# Patient Record
Sex: FEMALE | Race: BLACK OR AFRICAN AMERICAN | URBAN - METROPOLITAN AREA
[De-identification: names, ages, dates, MRNs, and addresses within clinical notes are randomized per-mention and may not be internally consistent; named-entity substitution may affect disease eponyms.]

---

## 2017-10-28 ENCOUNTER — EMERGENCY (EMERGENCY)
Facility: HOSPITAL | Age: 42
LOS: 1 days | Discharge: HOME | End: 2017-10-28
Attending: EMERGENCY MEDICINE | Admitting: EMERGENCY MEDICINE
Payer: SELF-PAY

## 2017-10-28 VITALS
SYSTOLIC BLOOD PRESSURE: 160 MMHG | OXYGEN SATURATION: 100 % | RESPIRATION RATE: 20 BRPM | TEMPERATURE: 99 F | DIASTOLIC BLOOD PRESSURE: 112 MMHG | HEART RATE: 96 BPM

## 2017-10-28 LAB
ALBUMIN SERPL ELPH-MCNC: 3.4 G/DL — SIGNIFICANT CHANGE UP (ref 3.3–5)
ALP SERPL-CCNC: 107 U/L — SIGNIFICANT CHANGE UP (ref 40–120)
ALT FLD-CCNC: 10 U/L — SIGNIFICANT CHANGE UP (ref 4–33)
AST SERPL-CCNC: 20 U/L — SIGNIFICANT CHANGE UP (ref 4–32)
BASE EXCESS BLDV CALC-SCNC: 0.5 MMOL/L — SIGNIFICANT CHANGE UP
BASOPHILS # BLD AUTO: 0.05 K/UL — SIGNIFICANT CHANGE UP (ref 0–0.2)
BASOPHILS NFR BLD AUTO: 0.8 % — SIGNIFICANT CHANGE UP (ref 0–2)
BILIRUB SERPL-MCNC: 0.3 MG/DL — SIGNIFICANT CHANGE UP (ref 0.2–1.2)
BLOOD GAS VENOUS - CREATININE: 1.74 MG/DL — HIGH (ref 0.5–1.3)
BUN SERPL-MCNC: 28 MG/DL — HIGH (ref 7–23)
CALCIUM SERPL-MCNC: 8.3 MG/DL — LOW (ref 8.4–10.5)
CHLORIDE BLDV-SCNC: 109 MMOL/L — HIGH (ref 96–108)
CHLORIDE SERPL-SCNC: 106 MMOL/L — SIGNIFICANT CHANGE UP (ref 98–107)
CO2 SERPL-SCNC: 24 MMOL/L — SIGNIFICANT CHANGE UP (ref 22–31)
CREAT SERPL-MCNC: 1.81 MG/DL — HIGH (ref 0.5–1.3)
EOSINOPHIL # BLD AUTO: 0.1 K/UL — SIGNIFICANT CHANGE UP (ref 0–0.5)
EOSINOPHIL NFR BLD AUTO: 1.5 % — SIGNIFICANT CHANGE UP (ref 0–6)
GAS PNL BLDV: 140 MMOL/L — SIGNIFICANT CHANGE UP (ref 136–146)
GLUCOSE BLDV-MCNC: 83 — SIGNIFICANT CHANGE UP (ref 70–99)
GLUCOSE SERPL-MCNC: 86 MG/DL — SIGNIFICANT CHANGE UP (ref 70–99)
HCG SERPL-ACNC: < 5 MIU/ML — SIGNIFICANT CHANGE UP
HCO3 BLDV-SCNC: 23 MMOL/L — SIGNIFICANT CHANGE UP (ref 20–27)
HCT VFR BLD CALC: 36.2 % — SIGNIFICANT CHANGE UP (ref 34.5–45)
HCT VFR BLDV CALC: 34.9 % — SIGNIFICANT CHANGE UP (ref 34.5–45)
HGB BLD-MCNC: 10.9 G/DL — LOW (ref 11.5–15.5)
HGB BLDV-MCNC: 11.3 G/DL — LOW (ref 11.5–15.5)
IMM GRANULOCYTES # BLD AUTO: 0.03 # — SIGNIFICANT CHANGE UP
IMM GRANULOCYTES NFR BLD AUTO: 0.5 % — SIGNIFICANT CHANGE UP (ref 0–1.5)
LACTATE BLDV-MCNC: 1.3 MMOL/L — SIGNIFICANT CHANGE UP (ref 0.5–2)
LYMPHOCYTES # BLD AUTO: 0.97 K/UL — LOW (ref 1–3.3)
LYMPHOCYTES # BLD AUTO: 14.6 % — SIGNIFICANT CHANGE UP (ref 13–44)
MCHC RBC-ENTMCNC: 24.6 PG — LOW (ref 27–34)
MCHC RBC-ENTMCNC: 30.1 % — LOW (ref 32–36)
MCV RBC AUTO: 81.7 FL — SIGNIFICANT CHANGE UP (ref 80–100)
MONOCYTES # BLD AUTO: 0.53 K/UL — SIGNIFICANT CHANGE UP (ref 0–0.9)
MONOCYTES NFR BLD AUTO: 8 % — SIGNIFICANT CHANGE UP (ref 2–14)
NEUTROPHILS # BLD AUTO: 4.98 K/UL — SIGNIFICANT CHANGE UP (ref 1.8–7.4)
NEUTROPHILS NFR BLD AUTO: 74.6 % — SIGNIFICANT CHANGE UP (ref 43–77)
NRBC # FLD: 0 — SIGNIFICANT CHANGE UP
PCO2 BLDV: 43 MMHG — SIGNIFICANT CHANGE UP (ref 41–51)
PH BLDV: 7.38 PH — SIGNIFICANT CHANGE UP (ref 7.32–7.43)
PLATELET # BLD AUTO: 306 K/UL — SIGNIFICANT CHANGE UP (ref 150–400)
PMV BLD: 10.2 FL — SIGNIFICANT CHANGE UP (ref 7–13)
PO2 BLDV: < 24 MMHG — LOW (ref 35–40)
POTASSIUM BLDV-SCNC: 3.8 MMOL/L — SIGNIFICANT CHANGE UP (ref 3.4–4.5)
POTASSIUM SERPL-MCNC: 4.4 MMOL/L — SIGNIFICANT CHANGE UP (ref 3.5–5.3)
POTASSIUM SERPL-SCNC: 4.4 MMOL/L — SIGNIFICANT CHANGE UP (ref 3.5–5.3)
PROT SERPL-MCNC: 7.6 G/DL — SIGNIFICANT CHANGE UP (ref 6–8.3)
RBC # BLD: 4.43 M/UL — SIGNIFICANT CHANGE UP (ref 3.8–5.2)
RBC # FLD: 19.9 % — HIGH (ref 10.3–14.5)
RETICS #: 0.2 10X6/UL — HIGH (ref 0.02–0.07)
RETICS/RBC NFR: 3.6 % — HIGH (ref 0.5–2.5)
SAO2 % BLDV: 18.7 % — LOW (ref 60–85)
SODIUM SERPL-SCNC: 143 MMOL/L — SIGNIFICANT CHANGE UP (ref 135–145)
WBC # BLD: 6.66 K/UL — SIGNIFICANT CHANGE UP (ref 3.8–10.5)
WBC # FLD AUTO: 6.66 K/UL — SIGNIFICANT CHANGE UP (ref 3.8–10.5)

## 2017-10-28 PROCEDURE — 99285 EMERGENCY DEPT VISIT HI MDM: CPT

## 2017-10-28 PROCEDURE — 71020: CPT | Mod: 26

## 2017-10-28 RX ORDER — HYDROMORPHONE HYDROCHLORIDE 2 MG/ML
2 INJECTION INTRAMUSCULAR; INTRAVENOUS; SUBCUTANEOUS ONCE
Refills: 0 | Status: DISCONTINUED | OUTPATIENT
Start: 2017-10-28 | End: 2017-10-28

## 2017-10-28 RX ORDER — DIPHENHYDRAMINE HCL 50 MG
25 CAPSULE ORAL ONCE
Refills: 0 | Status: COMPLETED | OUTPATIENT
Start: 2017-10-28 | End: 2017-10-28

## 2017-10-28 RX ADMIN — HYDROMORPHONE HYDROCHLORIDE 2 MILLIGRAM(S): 2 INJECTION INTRAMUSCULAR; INTRAVENOUS; SUBCUTANEOUS at 22:28

## 2017-10-28 RX ADMIN — HYDROMORPHONE HYDROCHLORIDE 2 MILLIGRAM(S): 2 INJECTION INTRAMUSCULAR; INTRAVENOUS; SUBCUTANEOUS at 22:00

## 2017-10-28 RX ADMIN — Medication 25 MILLIGRAM(S): at 22:00

## 2017-10-28 RX ADMIN — HYDROMORPHONE HYDROCHLORIDE 2 MILLIGRAM(S): 2 INJECTION INTRAMUSCULAR; INTRAVENOUS; SUBCUTANEOUS at 22:45

## 2017-10-28 RX ADMIN — HYDROMORPHONE HYDROCHLORIDE 2 MILLIGRAM(S): 2 INJECTION INTRAMUSCULAR; INTRAVENOUS; SUBCUTANEOUS at 22:15

## 2017-10-28 NOTE — ED PROVIDER NOTE - ATTENDING CONTRIBUTION TO CARE
Pertinent PMH/PSH/FHx/SHx and Review of Systems contained within:  43yo F pmh inclusive of WPW s/p ablation, CHF (EF of 40% per pt), sickle cell dx s/p recent admission at OSH for acute chest syndrome, presents for back and abd pain. States sickle cell crisis is usually in lower back and pain typical of crisis started approx 3 days ago, gradually worsening. Only different from typical SSC is abdominal involvement this time around - reports RLQ abd pain also X 3 days, constant, non-radiating, not exacerbated/relieved by anything in particular (taking 4mg po hydromorphone q4hrs today) , associated w/ dysuria. LMP was Oct 4th. Last BM was today and "normal". No fever/chills, No photophobia/eye pain/changes in vision, No ear pain/sore throat/dysphagia, No chest pain/palpitations, no SOB/cough/wheeze/stridor, No N/V/D, no frequency/discharge, No neck pain, no rash, no changes in neurological status/function.   Gen: Alert, in mild distress  Head: NC, AT, EOMI, normal lids/conjunctiva  ENT:  normal hearing, patent oropharynx without erythema/exudate, uvula midline  Neck: +supple, no tenderness/meningismus/JVD, +Trachea midline  Chest: no chest wall tenderness, equal chest rise  Pulm: Bilateral BS, normal resp effort, no wheeze/stridor/retractions  CV: RRR, no M/R/G, +dist pulses  Abd: soft, ND, +TTP in RLQ and suprapubic region, +BS, no hepatosplenomegaly  Rectal: deferred  Mskel: no edema/erythema/cyanosis, +TTP lower back  Skin: no rash  Neuro: AAOx3  MDM:  43yo F pmh as above, here for lower back pain, RLQ abd pain, Pertinent PMH/PSH/FHx/SHx and Review of Systems contained within:  41yo F pmh inclusive of WPW s/p ablation, CHF (EF of 40% per pt), sickle cell dx s/p recent admission at OSH for acute chest syndrome, presents for back and abd pain. States sickle cell crisis is usually in lower back and pain typical of crisis started approx 3 days ago, gradually worsening. Only different from typical SSC is abdominal involvement this time around - reports RLQ abd pain also X 3 days, constant, non-radiating, not exacerbated/relieved by anything in particular (taking 4mg po hydromorphone q4hrs today) , associated w/ dysuria. LMP was Oct 4th. Last BM was today and "normal". No fever/chills, No photophobia/eye pain/changes in vision, No ear pain/sore throat/dysphagia, No chest pain/palpitations, no SOB/cough/wheeze/stridor, No N/V/D, no frequency/discharge, No neck pain, no rash, no changes in neurological status/function.   Gen: Alert, in mild distress  Head: NC, AT, EOMI, normal lids/conjunctiva  ENT:  normal hearing, patent oropharynx without erythema/exudate, uvula midline  Neck: +supple, no tenderness/meningismus/JVD, +Trachea midline  Chest: no chest wall tenderness, equal chest rise  Pulm: Bilateral BS, normal resp effort, no wheeze/stridor/retractions  CV: RRR, no M/R/G, +dist pulses  Abd: soft, ND, +TTP in RLQ and suprapubic region, +BS, no hepatosplenomegaly  Rectal: deferred  Mskel: no edema/erythema/cyanosis, +TTP lower back  Skin: no rash  Neuro: AAOx3  MDM:  41yo F pmh as above, here for lower back pain, RLQ abd pain, dysuria. Diff dx includes UTI/pyelonephritis vs appendicitis vs ovarian pathology (torsion vs cyst) vs SSC. Labs,UA, iv pain mngmt, US, possibly CT if US not conclusive.

## 2017-10-28 NOTE — ED ADULT TRIAGE NOTE - CHIEF COMPLAINT QUOTE
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 Also c/o RLQ pain x 3d. Also c/o SOB. Denies all other adverse symptoms at this time.

## 2017-10-28 NOTE — ED PROVIDER NOTE - PLAN OF CARE
You need to follow up with your doctor asap regarding your blood pressure and chronic sickle cell management. You were found to also have a urine infection so we sent antibiotics to your pharmacy for you. If you have any new issues please come right back to the ED. If you develop a fever, pain in your sides, or you are persistently vomiting you need to come back to the ED.

## 2017-10-28 NOTE — ED ADULT NURSE NOTE - OBJECTIVE STATEMENT
pt brought to rm 4, A&Ox3, amb @ baseline, multiple scars noted on upper extremities pt states 2/2 IVs in past, c/o abdm pain x past 2 days w/ intermittent nausea no episodes of emesis, pt states usual SCC symptoms, unable to palpate abdm 2/2 pain, last BM today, VS as noted, MD corral, MD Blanca @ bedside for IV US, labs sent, awaiting results and further orders, will continue to monitor.

## 2017-10-28 NOTE — ED PROVIDER NOTE - CARE PLAN
Principal Discharge DX:	Abdominal pain  Secondary Diagnosis:	Sickle cell anemia with crisis Principal Discharge DX:	Abdominal pain  Instructions for follow-up, activity and diet:	You need to follow up with your doctor asap regarding your blood pressure and chronic sickle cell management. You were found to also have a urine infection so we sent antibiotics to your pharmacy for you. If you have any new issues please come right back to the ED. If you develop a fever, pain in your sides, or you are persistently vomiting you need to come back to the ED.  Secondary Diagnosis:	Sickle cell anemia with crisis

## 2017-10-28 NOTE — ED PROVIDER NOTE - PROGRESS NOTE DETAILS
Pt playing solitaire on cell phone. No acute process on CT wet read. Pt prefers to be discharged if no acute process. Will f/u with hematologist in AM. Requesting more PO dilaudid + PO benadryl. MIKE. Reg Bagley: Patient signed out by Dr. Smiley. She is sitting upright with her cellphone appears to be texting. She does not appear to be any acute distress. Per Dr. Smiley plan is to discharge the patient after official radiology CT read. Will follow up.

## 2017-10-29 VITALS
OXYGEN SATURATION: 100 % | DIASTOLIC BLOOD PRESSURE: 119 MMHG | RESPIRATION RATE: 15 BRPM | HEART RATE: 102 BPM | SYSTOLIC BLOOD PRESSURE: 185 MMHG | TEMPERATURE: 98 F

## 2017-10-29 LAB
APPEARANCE UR: SIGNIFICANT CHANGE UP
BACTERIA # UR AUTO: HIGH
BILIRUB UR-MCNC: NEGATIVE — SIGNIFICANT CHANGE UP
BLOOD UR QL VISUAL: NEGATIVE — SIGNIFICANT CHANGE UP
COLOR SPEC: YELLOW — SIGNIFICANT CHANGE UP
GLUCOSE UR-MCNC: NEGATIVE — SIGNIFICANT CHANGE UP
HCG SERPL-ACNC: < 5 MIU/ML — SIGNIFICANT CHANGE UP
KETONES UR-MCNC: NEGATIVE — SIGNIFICANT CHANGE UP
LEUKOCYTE ESTERASE UR-ACNC: SIGNIFICANT CHANGE UP
MUCOUS THREADS # UR AUTO: SIGNIFICANT CHANGE UP
NITRITE UR-MCNC: NEGATIVE — SIGNIFICANT CHANGE UP
PH UR: 6 — SIGNIFICANT CHANGE UP (ref 4.6–8)
PROT UR-MCNC: 500 — HIGH
RBC CASTS # UR COMP ASSIST: SIGNIFICANT CHANGE UP (ref 0–?)
SP GR SPEC: 1.02 — SIGNIFICANT CHANGE UP (ref 1–1.03)
SQUAMOUS # UR AUTO: SIGNIFICANT CHANGE UP
UROBILINOGEN FLD QL: 1 E.U. — SIGNIFICANT CHANGE UP (ref 0.1–0.2)
WBC UR QL: HIGH (ref 0–?)

## 2017-10-29 PROCEDURE — 74176 CT ABD & PELVIS W/O CONTRAST: CPT | Mod: 26

## 2017-10-29 PROCEDURE — 76856 US EXAM PELVIC COMPLETE: CPT | Mod: 26

## 2017-10-29 PROCEDURE — 76705 ECHO EXAM OF ABDOMEN: CPT | Mod: 26

## 2017-10-29 RX ORDER — HYDROMORPHONE HYDROCHLORIDE 2 MG/ML
4 INJECTION INTRAMUSCULAR; INTRAVENOUS; SUBCUTANEOUS ONCE
Refills: 0 | Status: DISCONTINUED | OUTPATIENT
Start: 2017-10-29 | End: 2017-10-29

## 2017-10-29 RX ORDER — HYDROMORPHONE HYDROCHLORIDE 2 MG/ML
1 INJECTION INTRAMUSCULAR; INTRAVENOUS; SUBCUTANEOUS ONCE
Refills: 0 | Status: DISCONTINUED | OUTPATIENT
Start: 2017-10-29 | End: 2017-10-29

## 2017-10-29 RX ORDER — HYDROMORPHONE HYDROCHLORIDE 2 MG/ML
2 INJECTION INTRAMUSCULAR; INTRAVENOUS; SUBCUTANEOUS ONCE
Refills: 0 | Status: DISCONTINUED | OUTPATIENT
Start: 2017-10-29 | End: 2017-10-29

## 2017-10-29 RX ORDER — DIPHENHYDRAMINE HCL 50 MG
25 CAPSULE ORAL ONCE
Refills: 0 | Status: COMPLETED | OUTPATIENT
Start: 2017-10-29 | End: 2017-10-29

## 2017-10-29 RX ORDER — CEPHALEXIN 500 MG
1 CAPSULE ORAL
Qty: 20 | Refills: 0
Start: 2017-10-29 | End: 2017-11-03

## 2017-10-29 RX ADMIN — HYDROMORPHONE HYDROCHLORIDE 4 MILLIGRAM(S): 2 INJECTION INTRAMUSCULAR; INTRAVENOUS; SUBCUTANEOUS at 07:26

## 2017-10-29 RX ADMIN — HYDROMORPHONE HYDROCHLORIDE 4 MILLIGRAM(S): 2 INJECTION INTRAMUSCULAR; INTRAVENOUS; SUBCUTANEOUS at 02:41

## 2017-10-29 RX ADMIN — HYDROMORPHONE HYDROCHLORIDE 4 MILLIGRAM(S): 2 INJECTION INTRAMUSCULAR; INTRAVENOUS; SUBCUTANEOUS at 06:00

## 2017-10-29 RX ADMIN — HYDROMORPHONE HYDROCHLORIDE 2 MILLIGRAM(S): 2 INJECTION INTRAMUSCULAR; INTRAVENOUS; SUBCUTANEOUS at 00:22

## 2017-10-29 RX ADMIN — HYDROMORPHONE HYDROCHLORIDE 4 MILLIGRAM(S): 2 INJECTION INTRAMUSCULAR; INTRAVENOUS; SUBCUTANEOUS at 04:50

## 2017-10-29 RX ADMIN — Medication 25 MILLIGRAM(S): at 00:46

## 2017-10-29 RX ADMIN — HYDROMORPHONE HYDROCHLORIDE 2 MILLIGRAM(S): 2 INJECTION INTRAMUSCULAR; INTRAVENOUS; SUBCUTANEOUS at 00:45

## 2017-10-29 RX ADMIN — HYDROMORPHONE HYDROCHLORIDE 4 MILLIGRAM(S): 2 INJECTION INTRAMUSCULAR; INTRAVENOUS; SUBCUTANEOUS at 04:00

## 2017-10-29 NOTE — ED ADULT NURSE REASSESSMENT NOTE - NS ED NURSE REASSESS COMMENT FT1
alert states pain is improved but not gone  wants to leave discharged and understands instructions  heplock removed

## 2017-10-31 ENCOUNTER — EMERGENCY (EMERGENCY)
Facility: HOSPITAL | Age: 42
LOS: 1 days | Discharge: LEFT BEFORE DISCHARGE | End: 2017-10-31
Attending: EMERGENCY MEDICINE | Admitting: INTERNAL MEDICINE
Payer: MEDICAID

## 2017-10-31 VITALS
OXYGEN SATURATION: 99 % | DIASTOLIC BLOOD PRESSURE: 100 MMHG | RESPIRATION RATE: 20 BRPM | SYSTOLIC BLOOD PRESSURE: 150 MMHG | HEART RATE: 84 BPM | TEMPERATURE: 98 F

## 2017-10-31 LAB
ALBUMIN SERPL ELPH-MCNC: 3.3 G/DL — SIGNIFICANT CHANGE UP (ref 3.3–5)
ALP SERPL-CCNC: 118 U/L — SIGNIFICANT CHANGE UP (ref 40–120)
ALT FLD-CCNC: 13 U/L — SIGNIFICANT CHANGE UP (ref 4–33)
APTT BLD: 31.9 SEC — SIGNIFICANT CHANGE UP (ref 27.5–37.4)
AST SERPL-CCNC: 22 U/L — SIGNIFICANT CHANGE UP (ref 4–32)
BASE EXCESS BLDV CALC-SCNC: -0.5 MMOL/L — SIGNIFICANT CHANGE UP
BASOPHILS # BLD AUTO: 0.04 K/UL — SIGNIFICANT CHANGE UP (ref 0–0.2)
BASOPHILS NFR BLD AUTO: 0.6 % — SIGNIFICANT CHANGE UP (ref 0–2)
BILIRUB SERPL-MCNC: 0.3 MG/DL — SIGNIFICANT CHANGE UP (ref 0.2–1.2)
BLD GP AB SCN SERPL QL: NEGATIVE — SIGNIFICANT CHANGE UP
BLOOD GAS VENOUS - CREATININE: 1.4 MG/DL — HIGH (ref 0.5–1.3)
BUN SERPL-MCNC: 20 MG/DL — SIGNIFICANT CHANGE UP (ref 7–23)
CALCIUM SERPL-MCNC: 8.4 MG/DL — SIGNIFICANT CHANGE UP (ref 8.4–10.5)
CHLORIDE BLDV-SCNC: 110 MMOL/L — HIGH (ref 96–108)
CHLORIDE SERPL-SCNC: 106 MMOL/L — SIGNIFICANT CHANGE UP (ref 98–107)
CK MB BLD-MCNC: 1.91 NG/ML — SIGNIFICANT CHANGE UP (ref 1–4.7)
CK SERPL-CCNC: 90 U/L — SIGNIFICANT CHANGE UP (ref 25–170)
CO2 SERPL-SCNC: 23 MMOL/L — SIGNIFICANT CHANGE UP (ref 22–31)
CREAT SERPL-MCNC: 1.37 MG/DL — HIGH (ref 0.5–1.3)
EOSINOPHIL # BLD AUTO: 0.15 K/UL — SIGNIFICANT CHANGE UP (ref 0–0.5)
EOSINOPHIL NFR BLD AUTO: 2.2 % — SIGNIFICANT CHANGE UP (ref 0–6)
GAS PNL BLDV: 138 MMOL/L — SIGNIFICANT CHANGE UP (ref 136–146)
GLUCOSE BLDV-MCNC: 77 — SIGNIFICANT CHANGE UP (ref 70–99)
GLUCOSE SERPL-MCNC: 80 MG/DL — SIGNIFICANT CHANGE UP (ref 70–99)
HCO3 BLDV-SCNC: 24 MMOL/L — SIGNIFICANT CHANGE UP (ref 20–27)
HCT VFR BLD CALC: 29.2 % — LOW (ref 34.5–45)
HCT VFR BLDV CALC: 29.5 % — LOW (ref 34.5–45)
HGB BLD-MCNC: 9 G/DL — LOW (ref 11.5–15.5)
HGB BLDV-MCNC: 9.5 G/DL — LOW (ref 11.5–15.5)
HIV1 AG SER QL: SIGNIFICANT CHANGE UP
HIV1+2 AB SPEC QL: SIGNIFICANT CHANGE UP
IMM GRANULOCYTES # BLD AUTO: 0.06 # — SIGNIFICANT CHANGE UP
IMM GRANULOCYTES NFR BLD AUTO: 0.9 % — SIGNIFICANT CHANGE UP (ref 0–1.5)
INR BLD: 0.99 — SIGNIFICANT CHANGE UP (ref 0.88–1.17)
LACTATE BLDV-MCNC: 1.1 MMOL/L — SIGNIFICANT CHANGE UP (ref 0.5–2)
LDH SERPL L TO P-CCNC: 208 U/L — SIGNIFICANT CHANGE UP (ref 135–225)
LYMPHOCYTES # BLD AUTO: 0.93 K/UL — LOW (ref 1–3.3)
LYMPHOCYTES # BLD AUTO: 13.7 % — SIGNIFICANT CHANGE UP (ref 13–44)
MCHC RBC-ENTMCNC: 24.5 PG — LOW (ref 27–34)
MCHC RBC-ENTMCNC: 30.8 % — LOW (ref 32–36)
MCV RBC AUTO: 79.3 FL — LOW (ref 80–100)
MONOCYTES # BLD AUTO: 0.52 K/UL — SIGNIFICANT CHANGE UP (ref 0–0.9)
MONOCYTES NFR BLD AUTO: 7.7 % — SIGNIFICANT CHANGE UP (ref 2–14)
NEUTROPHILS # BLD AUTO: 5.07 K/UL — SIGNIFICANT CHANGE UP (ref 1.8–7.4)
NEUTROPHILS NFR BLD AUTO: 74.9 % — SIGNIFICANT CHANGE UP (ref 43–77)
NRBC # FLD: 0 — SIGNIFICANT CHANGE UP
NT-PROBNP SERPL-SCNC: SIGNIFICANT CHANGE UP PG/ML
PCO2 BLDV: 34 MMHG — LOW (ref 41–51)
PH BLDV: 7.45 PH — HIGH (ref 7.32–7.43)
PLATELET # BLD AUTO: 263 K/UL — SIGNIFICANT CHANGE UP (ref 150–400)
PMV BLD: 11.2 FL — SIGNIFICANT CHANGE UP (ref 7–13)
PO2 BLDV: 41 MMHG — HIGH (ref 35–40)
POTASSIUM BLDV-SCNC: 3.8 MMOL/L — SIGNIFICANT CHANGE UP (ref 3.4–4.5)
POTASSIUM SERPL-MCNC: 4 MMOL/L — SIGNIFICANT CHANGE UP (ref 3.5–5.3)
POTASSIUM SERPL-SCNC: 4 MMOL/L — SIGNIFICANT CHANGE UP (ref 3.5–5.3)
PROT SERPL-MCNC: 7.1 G/DL — SIGNIFICANT CHANGE UP (ref 6–8.3)
PROTHROM AB SERPL-ACNC: 11.1 SEC — SIGNIFICANT CHANGE UP (ref 9.8–13.1)
RBC # BLD: 3.68 M/UL — LOW (ref 3.8–5.2)
RBC # FLD: 19.8 % — HIGH (ref 10.3–14.5)
RETICS #: 0.1 10X6/UL — HIGH (ref 0.02–0.07)
RETICS/RBC NFR: 3.3 % — HIGH (ref 0.5–2.5)
RH IG SCN BLD-IMP: POSITIVE — SIGNIFICANT CHANGE UP
SAO2 % BLDV: 74.9 % — SIGNIFICANT CHANGE UP (ref 60–85)
SODIUM SERPL-SCNC: 143 MMOL/L — SIGNIFICANT CHANGE UP (ref 135–145)
TROPONIN T SERPL-MCNC: < 0.06 NG/ML — SIGNIFICANT CHANGE UP (ref 0–0.06)
WBC # BLD: 6.77 K/UL — SIGNIFICANT CHANGE UP (ref 3.8–10.5)
WBC # FLD AUTO: 6.77 K/UL — SIGNIFICANT CHANGE UP (ref 3.8–10.5)

## 2017-10-31 PROCEDURE — 71020: CPT | Mod: 26

## 2017-10-31 PROCEDURE — 99285 EMERGENCY DEPT VISIT HI MDM: CPT

## 2017-10-31 RX ORDER — HYDROMORPHONE HYDROCHLORIDE 2 MG/ML
2 INJECTION INTRAMUSCULAR; INTRAVENOUS; SUBCUTANEOUS ONCE
Refills: 0 | Status: DISCONTINUED | OUTPATIENT
Start: 2017-10-31 | End: 2017-10-31

## 2017-10-31 RX ORDER — CEPHALEXIN 500 MG
500 CAPSULE ORAL ONCE
Refills: 0 | Status: COMPLETED | OUTPATIENT
Start: 2017-10-31 | End: 2017-10-31

## 2017-10-31 RX ORDER — DIPHENHYDRAMINE HCL 50 MG
50 CAPSULE ORAL ONCE
Refills: 0 | Status: COMPLETED | OUTPATIENT
Start: 2017-10-31 | End: 2017-10-31

## 2017-10-31 RX ORDER — CARVEDILOL PHOSPHATE 80 MG/1
12.5 CAPSULE, EXTENDED RELEASE ORAL ONCE
Refills: 0 | Status: COMPLETED | OUTPATIENT
Start: 2017-10-31 | End: 2017-10-31

## 2017-10-31 RX ORDER — AMIODARONE HYDROCHLORIDE 400 MG/1
200 TABLET ORAL DAILY
Refills: 0 | Status: DISCONTINUED | OUTPATIENT
Start: 2017-10-31 | End: 2017-11-01

## 2017-10-31 RX ORDER — HYDROMORPHONE HYDROCHLORIDE 2 MG/ML
4 INJECTION INTRAMUSCULAR; INTRAVENOUS; SUBCUTANEOUS ONCE
Refills: 0 | Status: DISCONTINUED | OUTPATIENT
Start: 2017-10-31 | End: 2017-10-31

## 2017-10-31 RX ORDER — SODIUM CHLORIDE 9 MG/ML
1000 INJECTION INTRAMUSCULAR; INTRAVENOUS; SUBCUTANEOUS ONCE
Refills: 0 | Status: COMPLETED | OUTPATIENT
Start: 2017-10-31 | End: 2017-10-31

## 2017-10-31 RX ADMIN — CARVEDILOL PHOSPHATE 12.5 MILLIGRAM(S): 80 CAPSULE, EXTENDED RELEASE ORAL at 23:17

## 2017-10-31 RX ADMIN — SODIUM CHLORIDE 2000 MILLILITER(S): 9 INJECTION INTRAMUSCULAR; INTRAVENOUS; SUBCUTANEOUS at 23:33

## 2017-10-31 RX ADMIN — Medication 50 MILLIGRAM(S): at 21:34

## 2017-10-31 RX ADMIN — HYDROMORPHONE HYDROCHLORIDE 2 MILLIGRAM(S): 2 INJECTION INTRAMUSCULAR; INTRAVENOUS; SUBCUTANEOUS at 23:33

## 2017-10-31 RX ADMIN — AMIODARONE HYDROCHLORIDE 200 MILLIGRAM(S): 400 TABLET ORAL at 23:44

## 2017-10-31 RX ADMIN — HYDROMORPHONE HYDROCHLORIDE 2 MILLIGRAM(S): 2 INJECTION INTRAMUSCULAR; INTRAVENOUS; SUBCUTANEOUS at 21:34

## 2017-10-31 RX ADMIN — HYDROMORPHONE HYDROCHLORIDE 2 MILLIGRAM(S): 2 INJECTION INTRAMUSCULAR; INTRAVENOUS; SUBCUTANEOUS at 23:44

## 2017-10-31 RX ADMIN — HYDROMORPHONE HYDROCHLORIDE 2 MILLIGRAM(S): 2 INJECTION INTRAMUSCULAR; INTRAVENOUS; SUBCUTANEOUS at 22:08

## 2017-10-31 RX ADMIN — Medication 500 MILLIGRAM(S): at 23:44

## 2017-10-31 NOTE — ED PROVIDER NOTE - OBJECTIVE STATEMENT
41yo F pmh inclusive of WPW s/p ablation, CHF (EF of 40% per pt), sickle cell dx s/p recent admission at OSH for acute chest syndrome, presents with pelvis pain and lower back pain typical of VOC.  Associated SOB and runs of SVT which patient takes Coreg and Amiodarone for.

## 2017-10-31 NOTE — ED PROVIDER NOTE - ATTENDING CONTRIBUTION TO CARE
I, Jennifer Cabot, MD, have performed a history and physical exam of the patient and discussed their management with the resident. I reviewed the resident's note and agree with the documented findings and plan of care. My medical decision making and observations are found above.    Cabot: 42F with PMH of WPW s/p ablation, CHF (EF of 40% per pt), sickle cell dx s/p recent admission at OSH for acute chest syndrome, who comes in with another pain crisis.  She reports bilateral chest pain, low back pain, and pelvic pain x 1 day.  No F/C/N/V/D/urinary sx.  No cough, sputum, SOB.  On exam, HDS, non toxic appearing, lungs CTAB, heart sounds normal, abd benign, no CVAT, no LE edema.  DDx includes simple SC crisis vs acute chest, UTI, pelvic infection.  Will check pelvic exam, full labs, including CBC, retic count, UA, cultures, CXR, LA.  Will control pain and hydrate.  Will reassess.

## 2017-10-31 NOTE — ED ADULT TRIAGE NOTE - CHIEF COMPLAINT QUOTE
Patient brought to ER from home by EMS for c/o side, hip and back pain related to sickle cell crisis. The Patient also has pain to right lower pelvic area that she states is not related to her sickle cell crisis.

## 2017-10-31 NOTE — ED ADULT NURSE NOTE - OBJECTIVE STATEMENT
Patient received to room 14, Aox4 and ambulatory. C/o 10/10 pain. Refuses to take off clothes, refuses to let nurse try to put in IV. States only wants U/S IV. Dr Ybarra aware. Patient c/o of hip and shoulder pain. States it triggers her SVT. Awaiting IV US and further orders. Will continue to monitor. Patient received to room 14, Aox4 and ambulatory. C/o 10/10 pain. Refuses to take off clothes, refuses to let nurse try to put in IV. States only wants U/S IV. Dr Ybarra aware. Patient c/o of hip and shoulder pain. States it triggers her SVT. Awaiting IV US and further orders. Will continue to monitor. C/o pain in hip shoulder and rib cage.

## 2017-10-31 NOTE — ED PROVIDER NOTE - PROGRESS NOTE DETAILS
JW Multiple runs of polymorphic VT in the ED.  Amiodarone.  Cardiology consult. JW Pt loaded with amiodarone admitted to CCU.  Discussed with cardiology NP.  Will give lidocaine with additional run. Jovani HSIEH: I received sign out on this patient from Dr Cabot.  Upon reassessment pt found to have runs or polymorphic VT on the cardiac monitor.  Pt takes amiodarone, but has not taken her medications recently.  Pt received PO home dose of amiodarone previously, will load with 150mg amio.  CCU consulted for admission.  Pt remains hypertensive, received IV dilaudid for pain.  Pt to be admitted to CCU.

## 2017-10-31 NOTE — ED PROVIDER NOTE - MEDICAL DECISION MAKING DETAILS
43yo F pmh inclusive of WPW s/p ablation, CHF (EF of 40% per pt), sickle cell dx s/p recent admission at OSH for acute chest syndrome, presents with pelvis pain and lower back pain typical of VOC.  Tachycardia.  Likely VOC.  Evaluate and treat for VOC.  CT from last visit reveals anasarca.  Pelvic US to R/O torsion.  Treat symptomatically.  Admit. 43yo F pmh inclusive of WPW s/p ablation, CHF (EF of 40% per pt), sickle cell dx s/p recent admission at OSH for acute chest syndrome, presents with pelvis pain and lower back pain typical of VOC.  Tachycardia.  Likely VOC.  Evaluate and treat for VOC.  CT from last visit reveals anasarca.  Pelvic US to R/O torsion.  Treat symptomatically.  Admit.    Cabot: 42F with PMH of WPW s/p ablation, CHF (EF of 40% per pt), sickle cell dx s/p recent admission at OSH for acute chest syndrome, who comes in with another pain crisis.  She reports bilateral chest pain, low back pain, and pelvic pain x 1 day.  No F/C/N/V/D/urinary sx.  No cough, sputum, SOB.  On exam, HDS, non toxic appearing, lungs CTAB, heart sounds normal, abd benign, no CVAT, no LE edema.  DDx includes simple SC crisis vs acute chest, UTI, pelvic infection.  Will check pelvic exam, full labs, including CBC, retic count, UA, cultures, CXR, LA.  Will control pain and hydrate.  Will reassess.

## 2017-11-01 VITALS — WEIGHT: 119.49 LBS

## 2017-11-01 DIAGNOSIS — D57.01 HB-SS DISEASE WITH ACUTE CHEST SYNDROME: ICD-10-CM

## 2017-11-01 DIAGNOSIS — I82.90 ACUTE EMBOLISM AND THROMBOSIS OF UNSPECIFIED VEIN: ICD-10-CM

## 2017-11-01 DIAGNOSIS — Z95.828 PRESENCE OF OTHER VASCULAR IMPLANTS AND GRAFTS: Chronic | ICD-10-CM

## 2017-11-01 DIAGNOSIS — I50.9 HEART FAILURE, UNSPECIFIED: ICD-10-CM

## 2017-11-01 DIAGNOSIS — I26.99 OTHER PULMONARY EMBOLISM WITHOUT ACUTE COR PULMONALE: ICD-10-CM

## 2017-11-01 DIAGNOSIS — I47.2 VENTRICULAR TACHYCARDIA: ICD-10-CM

## 2017-11-01 LAB
HAV IGM SER-ACNC: NONREACTIVE — SIGNIFICANT CHANGE UP
HBV CORE IGM SER-ACNC: NONREACTIVE — SIGNIFICANT CHANGE UP
HBV SURFACE AG SER-ACNC: NONREACTIVE — SIGNIFICANT CHANGE UP
HCG SERPL-ACNC: < 5 MIU/ML — SIGNIFICANT CHANGE UP
HCV AB S/CO SERPL IA: 0.22 S/CO — SIGNIFICANT CHANGE UP
HCV AB SERPL-IMP: SIGNIFICANT CHANGE UP
MAGNESIUM SERPL-MCNC: 2 MG/DL — SIGNIFICANT CHANGE UP (ref 1.6–2.6)

## 2017-11-01 RX ORDER — AMIODARONE HYDROCHLORIDE 400 MG/1
150 TABLET ORAL ONCE
Refills: 0 | Status: COMPLETED | OUTPATIENT
Start: 2017-11-01 | End: 2017-11-01

## 2017-11-01 RX ORDER — HYDRALAZINE HCL 50 MG
10 TABLET ORAL DAILY
Refills: 0 | Status: DISCONTINUED | OUTPATIENT
Start: 2017-11-01 | End: 2017-11-01

## 2017-11-01 RX ORDER — FUROSEMIDE 40 MG
40 TABLET ORAL DAILY
Refills: 0 | Status: DISCONTINUED | OUTPATIENT
Start: 2017-11-01 | End: 2017-11-01

## 2017-11-01 RX ORDER — RIVAROXABAN 15 MG-20MG
20 KIT ORAL EVERY 24 HOURS
Refills: 0 | Status: DISCONTINUED | OUTPATIENT
Start: 2017-11-01 | End: 2017-11-01

## 2017-11-01 RX ORDER — HYDROMORPHONE HYDROCHLORIDE 2 MG/ML
2 INJECTION INTRAMUSCULAR; INTRAVENOUS; SUBCUTANEOUS EVERY 4 HOURS
Refills: 0 | Status: DISCONTINUED | OUTPATIENT
Start: 2017-11-01 | End: 2017-11-01

## 2017-11-01 RX ORDER — ACETAMINOPHEN 500 MG
975 TABLET ORAL ONCE
Refills: 0 | Status: DISCONTINUED | OUTPATIENT
Start: 2017-11-01 | End: 2017-11-01

## 2017-11-01 RX ORDER — ACETAMINOPHEN 500 MG
1000 TABLET ORAL ONCE
Refills: 0 | Status: COMPLETED | OUTPATIENT
Start: 2017-11-01 | End: 2017-11-01

## 2017-11-01 RX ORDER — HYDROMORPHONE HYDROCHLORIDE 2 MG/ML
2 INJECTION INTRAMUSCULAR; INTRAVENOUS; SUBCUTANEOUS ONCE
Refills: 0 | Status: DISCONTINUED | OUTPATIENT
Start: 2017-11-01 | End: 2017-11-01

## 2017-11-01 RX ORDER — SENNA PLUS 8.6 MG/1
2 TABLET ORAL AT BEDTIME
Refills: 0 | Status: DISCONTINUED | OUTPATIENT
Start: 2017-11-01 | End: 2017-11-01

## 2017-11-01 RX ORDER — INFLUENZA VIRUS VACCINE 15; 15; 15; 15 UG/.5ML; UG/.5ML; UG/.5ML; UG/.5ML
0.5 SUSPENSION INTRAMUSCULAR ONCE
Refills: 0 | Status: DISCONTINUED | OUTPATIENT
Start: 2017-11-01 | End: 2017-11-01

## 2017-11-01 RX ORDER — DOCUSATE SODIUM 100 MG
100 CAPSULE ORAL THREE TIMES A DAY
Refills: 0 | Status: DISCONTINUED | OUTPATIENT
Start: 2017-11-01 | End: 2017-11-01

## 2017-11-01 RX ORDER — PROCAINAMIDE HCL 500 MG
1000 TABLET, EXTENDED RELEASE ORAL ONCE
Refills: 0 | Status: DISCONTINUED | OUTPATIENT
Start: 2017-11-01 | End: 2017-11-01

## 2017-11-01 RX ORDER — HYDROMORPHONE HYDROCHLORIDE 2 MG/ML
3 INJECTION INTRAMUSCULAR; INTRAVENOUS; SUBCUTANEOUS EVERY 4 HOURS
Refills: 0 | Status: DISCONTINUED | OUTPATIENT
Start: 2017-11-01 | End: 2017-11-01

## 2017-11-01 RX ORDER — NALOXONE HYDROCHLORIDE 4 MG/.1ML
0.2 SPRAY NASAL ONCE
Refills: 0 | Status: DISCONTINUED | OUTPATIENT
Start: 2017-11-01 | End: 2017-11-01

## 2017-11-01 RX ADMIN — HYDROMORPHONE HYDROCHLORIDE 2 MILLIGRAM(S): 2 INJECTION INTRAMUSCULAR; INTRAVENOUS; SUBCUTANEOUS at 02:45

## 2017-11-01 RX ADMIN — HYDROMORPHONE HYDROCHLORIDE 2 MILLIGRAM(S): 2 INJECTION INTRAMUSCULAR; INTRAVENOUS; SUBCUTANEOUS at 03:00

## 2017-11-01 RX ADMIN — HYDROMORPHONE HYDROCHLORIDE 2 MILLIGRAM(S): 2 INJECTION INTRAMUSCULAR; INTRAVENOUS; SUBCUTANEOUS at 06:15

## 2017-11-01 RX ADMIN — Medication 100 MILLIGRAM(S): at 05:39

## 2017-11-01 RX ADMIN — HYDROMORPHONE HYDROCHLORIDE 2 MILLIGRAM(S): 2 INJECTION INTRAMUSCULAR; INTRAVENOUS; SUBCUTANEOUS at 01:56

## 2017-11-01 RX ADMIN — HYDROMORPHONE HYDROCHLORIDE 2 MILLIGRAM(S): 2 INJECTION INTRAMUSCULAR; INTRAVENOUS; SUBCUTANEOUS at 00:22

## 2017-11-01 RX ADMIN — AMIODARONE HYDROCHLORIDE 618 MILLIGRAM(S): 400 TABLET ORAL at 00:45

## 2017-11-01 RX ADMIN — HYDROMORPHONE HYDROCHLORIDE 2 MILLIGRAM(S): 2 INJECTION INTRAMUSCULAR; INTRAVENOUS; SUBCUTANEOUS at 01:41

## 2017-11-01 RX ADMIN — HYDROMORPHONE HYDROCHLORIDE 2 MILLIGRAM(S): 2 INJECTION INTRAMUSCULAR; INTRAVENOUS; SUBCUTANEOUS at 06:00

## 2017-11-01 NOTE — H&P ADULT - PROBLEM SELECTOR PROBLEM 3
Other pulmonary embolism without acute cor pulmonale, unspecified chronicity Acute decompensated heart failure

## 2017-11-01 NOTE — H&P ADULT - PROBLEM SELECTOR PLAN 2
pain management  given patient's hypervolemic   heme consult patient was given 1L of NS bolus in ED, now appears to be in ADHF, will hold IV hydration  pain management w/ dilaudid 4mg q4 hrs,  monitor RR  heme consult patient was given 1L of NS bolus in ED, now appears to be in ADHF, will hold IV hydration  pain management w/ dilaudid 4mg q4 hrs,  monitor RR  heme consult  start on bowel regimen- colace and senna

## 2017-11-01 NOTE — H&P ADULT - CONTRAINDICATIONS ACEI/ARB
Cecille Gutierrez   MRN: [id number]    Department:  [facility] Emergency Department   Date of Visit:  2/21/2018           Disclosure     Insurance plans vary and the physician(s) referred by the ER may not be covered by your plan.  Please contact y tell this physician (or your personal doctor if your instructions are to return to your personal doctor) about any new or lasting problems. The primary care or specialist physician will see patients referred from the [facility] Emergency Department.  Boyd Tapia No

## 2017-11-01 NOTE — H&P ADULT - ASSESSMENT
41yo F pmhx inclusive of WPW s/p ablation, failed attempt for AICD placement, CHF (EF of 40% per pt),  PE s/p IVC filter on xarelto, sickle cell dx s/p recent admission for acute chest syndrome a/w ventricular tachycardia likely 2/2 to fluid overload vs ischemia.  Patient is hypervolemic on exam, w/ lower extremity edema and SOB. 41yo F pmhx inclusive of WPW s/p ablation, failed attempt for AICD placement, CHF (EF of 40% per pt),  PE s/p IVC filter on xarelto, sickle cell dx s/p recent admission for acute chest syndrome a/w ventricular tachycardia likely 2/2 to fluid overload as ptient is hypervolemic on exam, w/ lower extremity edema and SOB w/ elevated proBNP  vs ischemia vs congenital. 43yo F pmhx inclusive of WPW s/p ablation, failed attempt for AICD placement, CHF (EF of 40% per pt),  PE s/p IVC filter on xarelto, sickle cell dx s/p recent admission for acute chest syndrome a/w ventricular tachycardia likely 2/2 to fluid overload as ptient is hypervolemic on exam, w/ lower extremity edema and SOB w/ elevated proBNP  vs demand ischemia from hypertension vs congenital. 43yo F pmhx inclusive of WPW s/p ablation, failed attempt for AICD placement, CHF (EF of 40% per pt),  PE s/p IVC filter on xarelto, sickle cell dx s/p recent admission for acute chest syndrome p/w acute sickle cell crisis c/b ventricular tachycardia likely 2/2 to fluid overload; patient hypervolemic on exam w/ elevated proBNP  vs demand ischemia from hypertension. 43yo F pmhx inclusive of WPW s/p ablation, failed attempt for AICD placement, CHF (EF of 40% per pt),  PE s/p IVC filter on xarelto, sickle cell dx s/p recent admission for acute chest syndrome p/w acute sickle cell crisis c/b ventricular tachycardia vs SVTs likely 2/2 to fluid overload; patient hypervolemic on exam w/ elevated proBNP  vs demand ischemia from hypertension.

## 2017-11-01 NOTE — H&P ADULT - MUSCULOSKELETAL
details… detailed exam normal/ROM intact/no joint swelling/no joint erythema/no joint warmth/no calf tenderness

## 2017-11-01 NOTE — H&P ADULT - PMH
Hb-SS disease with acute chest syndrome    Other pulmonary embolism without acute cor pulmonale, unspecified chronicity    Hayley-Parkinson-White syndrome Acute decompensated heart failure    Hb-SS disease with acute chest syndrome    Other pulmonary embolism without acute cor pulmonale, unspecified chronicity    Hayley-Parkinson-White syndrome

## 2017-11-01 NOTE — DISCHARGE NOTE ADULT - HOSPITAL COURSE
43yo F pmhx inclusive of WPW s/p ablation, failed attempt for AICD placement, CHF (EF of 40% per pt),  PE s/p IVC filter on xarelto, sickle cell dx s/p recent admission for acute chest syndrome last week p/w to [facility] w/ 10/10 generalized body ache.  In ED, patient was given 1L NS bolus. and dilaudid 2mg IVPx3 for pain.  While on monitor patient had multiple episodes of SVTs which patient was able to control by bearing down. In ER, patient had episode of polymorphic VT on tele, /144, given amiodarone IV bolus x1, converting patient to NSR, During VT episode, patient endorsed dizziness and mild SOB.  Cardiology consulted.  As per patient, she missed her pm dose of amiodarone and carvedilol.  For the last week, she has been noticing low urine output.  She has been having episodes of shortness of breath x2 days,  Today, experience palpitations [id number], intermittently  Patient denies chest pain, diaphoresis, nausea, vomiting, LOC, fever, chills.   On exam, patient has b/l LE pitting edema +1, no JVD, clear lungs, abdomen soft. ProBNP 40152. CXR clear. Initial ED vital signs:  Temp 98 F, /100, HR 84, 02 sat 99% on room air. Admit to CCU for close monitoring.  Patient given dilaudid for pain control.  While on telemetry, she had episodes of SVTs w/ aberrancy. Patient signed AMA, verbalized understanding of action and its risk/consequences to her condition 41yo F pmhx inclusive of WPW s/p ablation, failed attempt for AICD placement, CHF (EF of 40% per pt),  PE s/p IVC filter on xarelto, sickle cell dx s/p recent admission for acute chest syndrome last week p/w to [facility] w/ 10/10 generalized body ache.  In ED, patient was given 1L NS bolus. and dilaudid 2mg IVPx3 for pain.  While on monitor patient had multiple episodes of SVTs which patient was able to control by bearing down. In ER, patient had episode of polymorphic VT on tele, /144, given amiodarone IV bolus x1, converting patient to NSR, During VT episode, patient endorsed dizziness and mild SOB.  Cardiology consulted.  As per patient, she missed her pm dose of amiodarone and carvedilol.  For the last week, she has been noticing low urine output.  She has been having episodes of shortness of breath x2 days,  Today, experience palpitations [id number], intermittently  Patient denies chest pain, diaphoresis, nausea, vomiting, LOC, fever, chills.   On exam, patient has b/l LE pitting edema +1, no JVD, clear lungs, abdomen soft. ProBNP 29901. CXR clear. Initial ED vital signs:  Temp 98 F, /100, HR 84, 02 sat 99% on room air. Admit to CCU for close monitoring.  Patient given dilaudid for pain control.  While on telemetry, she had episodes of SVTs w/ aberrancy. Patient signed AMA, verbalized understanding of action and its risk/consequences to her condition and left the hospital.

## 2017-11-01 NOTE — DISCHARGE NOTE ADULT - CARE PLAN
Principal Discharge DX:	Ventricular tachycardia  Instructions for follow-up, activity and diet:	EP consult  Secondary Diagnosis:	Hb-SS disease with acute chest syndrome  Secondary Diagnosis:	Acute decompensated heart failure  Instructions for follow-up, activity and diet:	Trend BMP 2/2 diuresis and replete as needed  Daily weight monitoring, Strict I/O, Low sodium DASH diet  Check ECHO to evaluate LV/RV function and valvular abnormalities

## 2017-11-01 NOTE — H&P ADULT - NSHPLABSRESULTS_GEN_ALL_CORE
9.0                  143  | 23   | 20           6.77  >-----------< 263     ------------------------< 80                    29.2                 4.0  | 106  | 1.37                                         Ca 8.4   Mg 2.0   Ph x      proBNP  71400    CXR- no pulmonary disease    12 Lead EKG - NSR, HR 99, prolong QT    telemtry- NSR w/ episodes of SVTs 9.0                  143  | 23   | 20           6.77  >-----------< 263     ------------------------< 80                    29.2                 4.0  | 106  | 1.37                                         Ca 8.4   Mg 2.0   Ph x      proBNP  85572    CXR- no pulmonary disease    12 Lead EKG - NSR, HR 99, prolong QT    telemetry- NSR w/ episodes of SVTs w/ aberrancy

## 2017-11-01 NOTE — DISCHARGE NOTE ADULT - PATIENT PORTAL LINK FT
“You can access the [facility] Patient Portal, offered by [facility], by registering with the following website: [email or web address]”

## 2017-11-01 NOTE — DISCHARGE NOTE ADULT - PLAN OF CARE
EP consult Trend BMP 2/2 diuresis and replete as needed  Daily weight monitoring, Strict I/O, Low sodium DASH diet  Check ECHO to evaluate LV/RV function and valvular abnormalities

## 2017-11-01 NOTE — H&P ADULT - RS GEN PE MLT RESP DETAILS PC
normal/airway patent/breath sounds equal/good air movement/respirations non-labored/clear to auscultation bilaterally/no chest wall tenderness/no intercostal retractions

## 2017-11-01 NOTE — H&P ADULT - PROBLEM SELECTOR PLAN 3
Patient p/w SOB, . Has h/o CHF and is on lasix 40mg po daily at home.   CXR showed clear lungs,  pro BNP [id number]  will continue lasix 40mg and change to IVP  Trend BMP 2/2 diuresis and replete as needed  Daily weight monitoring, Strict I/O, Low sodium DASH diet Patient p/w SOB, . Has h/o CHF and is on lasix 40mg po daily at home.   CXR showed clear lungs,  pro BNP [id number]  patient is in sickle cell crisis, will evaluate lasix dose in am  Trend BMP 2/2 diuresis and replete as needed  Daily weight monitoring, Strict I/O, Low sodium DASH diet

## 2017-11-01 NOTE — H&P ADULT - HISTORY OF PRESENT ILLNESS
43yo F pmhx inclusive of WPW s/p ablation, failed attempt for AICD placement, CHF (EF of 40% per pt),  PE s/p IVC filter on xarelto, sickle cell dx s/p recent admission for acute chest syndrome last week p/w to [facility] w/ 10/10 ge body ache,  In ED, patient was given 1L NS bolus.   While on monitor patient had episode of polymorphic VT, /144, given amiodarone IV bolus x1.     As per patient, she missed her pm dose of amiodarone and carvedilol.  For the last week, she has been noticing low urine output.  She has been having episodes of shortness of breath x2 days,  Today, experience palpitations [id number], intermittently  Patient denies chest pain, diaphoresis, nausea, vomiting, dizziness, weakness, LOC, fever, chills.   On exam, patient has b/l LE pitting edema +1, no JVD, clear lungs, abdomen soft. ProBNP 01477. CXR clear.     Initial ED vital signs:  Temp 98 F, /100, HR 84, 02 sat 99% on room air.     Admit to CCU for close monitoring. 43yo F pmhx inclusive of WPW s/p ablation, failed attempt for AICD placement, CHF (EF of 40% per pt),  PE s/p IVC filter on xarelto, sickle cell dx s/p recent admission for acute chest syndrome last week p/w to [facility] w/ 10/10 ge body ache,  In ED, patient was given 1L NS bolus.   While on monitor patient had multiple episodes of SVTs which patient was able to control by bearing down. Patient had episode of polymorphic VT, /144, given amiodarone IV bolus x1, converting patient to NSR.     As per patient, she missed her pm dose of amiodarone and carvedilol.  For the last week, she has been noticing low urine output.  She has been having episodes of shortness of breath x2 days,  Today, experience palpitations [id number], intermittently  Patient denies chest pain, diaphoresis, nausea, vomiting, dizziness, weakness, LOC, fever, chills.   On exam, patient has b/l LE pitting edema +1, no JVD, clear lungs, abdomen soft. ProBNP 01469. CXR clear.     Initial ED vital signs:  Temp 98 F, /100, HR 84, 02 sat 99% on room air.     Admit to CCU for close monitoring. 41yo F pmhx inclusive of WPW s/p ablation, failed attempt for AICD placement, CHF (EF of 40% per pt),  PE s/p IVC filter on xarelto, sickle cell dx s/p recent admission for acute chest syndrome last week p/w to [facility] w/ 10/10 generalized body ache.  In ED, patient was given 1L NS bolus. and dilaudid 2mg IVPx3 for pain.  While on monitor patient had multiple episodes of SVTs which patient was able to control by bearing down. Patient had episode of polymorphic VT, /144, given amiodarone IV bolus x1, converting patient to NSR.     As per patient, she missed her pm dose of amiodarone and carvedilol.  For the last week, she has been noticing low urine output.  She has been having episodes of shortness of breath x2 days,  Today, experience palpitations [id number], intermittently  Patient denies chest pain, diaphoresis, nausea, vomiting, dizziness, weakness, LOC, fever, chills.   On exam, patient has b/l LE pitting edema +1, no JVD, clear lungs, abdomen soft. ProBNP [id number]. CXR clear.     Initial ED vital signs:  Temp 98 F, /100, HR 84, 02 sat 99% on room air.     Admit to CCU for close monitoring. 43yo F pmhx inclusive of WPW s/p ablation, failed attempt for AICD placement, CHF (EF of 40% per pt),  PE s/p IVC filter on xarelto, sickle cell dx s/p recent admission for acute chest syndrome last week p/w to [facility] w/ 10/10 generalized body ache.  In ED, patient was given 1L NS bolus. and dilaudid 2mg IVPx3 for pain.  While on monitor patient had multiple episodes of SVTs which patient was able to control by bearing down. Patient had episode of polymorphic VT, /144, given amiodarone IV bolus x1, converting patient to NSR.  During VT episode, patient endorsed dizziness and mild SOB.      As per patient, she missed her pm dose of amiodarone and carvedilol.  For the last week, she has been noticing low urine output.  She has been having episodes of shortness of breath x2 days,  Today, experience palpitations [id number], intermittently  Patient denies chest pain, diaphoresis, nausea, vomiting, LOC, fever, chills.   On exam, patient has b/l LE pitting edema +1, no JVD, clear lungs, abdomen soft. ProBNP 59469. CXR clear.     Initial ED vital signs:  Temp 98 F, /100, HR 84, 02 sat 99% on room air.     Admit to CCU for close monitoring. 43yo F pmhx inclusive of WPW s/p ablation, failed attempt for AICD placement, CHF (EF of 40% per pt),  PE s/p IVC filter on xarelto, sickle cell dx s/p recent admission for acute chest syndrome last week p/w to [facility] w/ 10/10 generalized body ache.  In ED, patient was given 1L NS bolus. and dilaudid 2mg IVPx3 for pain.  While on monitor patient had multiple episodes of SVTs which patient was able to control by bearing down. In ER, patient had episode of polymorphic VT on tele, /144, given amiodarone IV bolus x1, converting patient to NSR, During VT episode, patient endorsed dizziness and mild SOB.  Cardiology consulted.        As per patient, she missed her pm dose of amiodarone and carvedilol.  For the last week, she has been noticing low urine output.  She has been having episodes of shortness of breath x2 days,  Today, experience palpitations [id number], intermittently  Patient denies chest pain, diaphoresis, nausea, vomiting, LOC, fever, chills.   On exam, patient has b/l LE pitting edema +1, no JVD, clear lungs, abdomen soft. ProBNP [id number]. CXR clear.     Initial ED vital signs:  Temp 98 F, /100, HR 84, 02 sat 99% on room air.     Admit to CCU for close monitoring. 43yo F pmhx inclusive of WPW s/p ablation, failed attempt for AICD placement, CHF (EF of 40% per pt),  PE s/p IVC filter on xarelto, sickle cell dx s/p recent admission for acute chest syndrome last week p/w to [facility] w/ 10/10 generalized body ache.  In ED, patient was given 1L NS bolus. and dilaudid 2mg IVPx3 for pain.  While on monitor patient had multiple episodes of SVTs which patient was able to control by bearing down. In ER, patient had episode of polymorphic VT on tele, /144, given amiodarone IV bolus x1, converting patient to NSR, During VT episode, patient endorsed dizziness and mild SOB.  Cardiology consulted.    As per patient, she missed her pm dose of amiodarone and carvedilol.  For the last week, she has been noticing low urine output.  She has been having episodes of shortness of breath x2 days,  Today, experience palpitations [id number], intermittently  Patient denies chest pain, diaphoresis, nausea, vomiting, LOC, fever, chills.   On exam, patient has b/l LE pitting edema +1, no JVD, clear lungs, abdomen soft. ProBNP 53865. CXR clear.     Initial ED vital signs:  Temp 98 F, /100, HR 84, 02 sat 99% on room air.     Admit to CCU for close monitoring.

## 2017-11-01 NOTE — H&P ADULT - PROBLEM SELECTOR PROBLEM 4
VTE (venous thromboembolism) Other pulmonary embolism without acute cor pulmonale, unspecified chronicity

## 2017-11-02 LAB — T PALLIDUM AB TITR SER: NEGATIVE — SIGNIFICANT CHANGE UP

## 2017-11-03 ENCOUNTER — EMERGENCY (EMERGENCY)
Facility: HOSPITAL | Age: 42
LOS: 1 days | Discharge: LEFT BEFORE DISCHARGE | End: 2017-11-03
Attending: EMERGENCY MEDICINE | Admitting: HOSPITALIST
Payer: MEDICAID

## 2017-11-03 VITALS
RESPIRATION RATE: 16 BRPM | HEART RATE: 86 BPM | DIASTOLIC BLOOD PRESSURE: 140 MMHG | OXYGEN SATURATION: 100 % | SYSTOLIC BLOOD PRESSURE: 168 MMHG

## 2017-11-03 VITALS
TEMPERATURE: 98 F | RESPIRATION RATE: 18 BRPM | OXYGEN SATURATION: 100 % | DIASTOLIC BLOOD PRESSURE: 116 MMHG | SYSTOLIC BLOOD PRESSURE: 151 MMHG | HEART RATE: 201 BPM

## 2017-11-03 DIAGNOSIS — Z95.828 PRESENCE OF OTHER VASCULAR IMPLANTS AND GRAFTS: Chronic | ICD-10-CM

## 2017-11-03 DIAGNOSIS — D57.00 HB-SS DISEASE WITH CRISIS, UNSPECIFIED: ICD-10-CM

## 2017-11-03 LAB
ALBUMIN SERPL ELPH-MCNC: 3.1 G/DL — LOW (ref 3.3–5)
ALP SERPL-CCNC: 141 U/L — HIGH (ref 40–120)
ALT FLD-CCNC: 16 U/L — SIGNIFICANT CHANGE UP (ref 4–33)
APTT BLD: 30.4 SEC — SIGNIFICANT CHANGE UP (ref 27.5–37.4)
AST SERPL-CCNC: 26 U/L — SIGNIFICANT CHANGE UP (ref 4–32)
BASOPHILS # BLD AUTO: 0.08 K/UL — SIGNIFICANT CHANGE UP (ref 0–0.2)
BASOPHILS NFR BLD AUTO: 0.9 % — SIGNIFICANT CHANGE UP (ref 0–2)
BILIRUB SERPL-MCNC: 0.2 MG/DL — SIGNIFICANT CHANGE UP (ref 0.2–1.2)
BUN SERPL-MCNC: 35 MG/DL — HIGH (ref 7–23)
CALCIUM SERPL-MCNC: 8.3 MG/DL — LOW (ref 8.4–10.5)
CHLORIDE SERPL-SCNC: 105 MMOL/L — SIGNIFICANT CHANGE UP (ref 98–107)
CK MB BLD-MCNC: 1.2 — SIGNIFICANT CHANGE UP (ref 0–2.5)
CK MB BLD-MCNC: 3.2 NG/ML — SIGNIFICANT CHANGE UP (ref 1–4.7)
CK SERPL-CCNC: 265 U/L — HIGH (ref 25–170)
CO2 SERPL-SCNC: 23 MMOL/L — SIGNIFICANT CHANGE UP (ref 22–31)
CREAT SERPL-MCNC: 1.89 MG/DL — HIGH (ref 0.5–1.3)
EOSINOPHIL # BLD AUTO: 0.3 K/UL — SIGNIFICANT CHANGE UP (ref 0–0.5)
EOSINOPHIL NFR BLD AUTO: 3.5 % — SIGNIFICANT CHANGE UP (ref 0–6)
GLUCOSE SERPL-MCNC: 78 MG/DL — SIGNIFICANT CHANGE UP (ref 70–99)
HCT VFR BLD CALC: 29.9 % — LOW (ref 34.5–45)
HGB BLD-MCNC: 9.1 G/DL — LOW (ref 11.5–15.5)
IMM GRANULOCYTES # BLD AUTO: 0.04 # — SIGNIFICANT CHANGE UP
IMM GRANULOCYTES NFR BLD AUTO: 0.5 % — SIGNIFICANT CHANGE UP (ref 0–1.5)
INR BLD: 1.01 — SIGNIFICANT CHANGE UP (ref 0.88–1.17)
LYMPHOCYTES # BLD AUTO: 1.51 K/UL — SIGNIFICANT CHANGE UP (ref 1–3.3)
LYMPHOCYTES # BLD AUTO: 17.8 % — SIGNIFICANT CHANGE UP (ref 13–44)
MAGNESIUM SERPL-MCNC: 1.8 MG/DL — SIGNIFICANT CHANGE UP (ref 1.6–2.6)
MCHC RBC-ENTMCNC: 24.5 PG — LOW (ref 27–34)
MCHC RBC-ENTMCNC: 30.4 % — LOW (ref 32–36)
MCV RBC AUTO: 80.4 FL — SIGNIFICANT CHANGE UP (ref 80–100)
MONOCYTES # BLD AUTO: 0.52 K/UL — SIGNIFICANT CHANGE UP (ref 0–0.9)
MONOCYTES NFR BLD AUTO: 6.1 % — SIGNIFICANT CHANGE UP (ref 2–14)
NEUTROPHILS # BLD AUTO: 6.02 K/UL — SIGNIFICANT CHANGE UP (ref 1.8–7.4)
NEUTROPHILS NFR BLD AUTO: 71.2 % — SIGNIFICANT CHANGE UP (ref 43–77)
NRBC # FLD: 0 — SIGNIFICANT CHANGE UP
PHOSPHATE SERPL-MCNC: 3.3 MG/DL — SIGNIFICANT CHANGE UP (ref 2.5–4.5)
PLATELET # BLD AUTO: 284 K/UL — SIGNIFICANT CHANGE UP (ref 150–400)
PMV BLD: 11.1 FL — SIGNIFICANT CHANGE UP (ref 7–13)
POTASSIUM SERPL-MCNC: 3.9 MMOL/L — SIGNIFICANT CHANGE UP (ref 3.5–5.3)
POTASSIUM SERPL-SCNC: 3.9 MMOL/L — SIGNIFICANT CHANGE UP (ref 3.5–5.3)
PROT SERPL-MCNC: 7 G/DL — SIGNIFICANT CHANGE UP (ref 6–8.3)
PROTHROM AB SERPL-ACNC: 11.3 SEC — SIGNIFICANT CHANGE UP (ref 9.8–13.1)
RBC # BLD: 3.72 M/UL — LOW (ref 3.8–5.2)
RBC # FLD: 20.1 % — HIGH (ref 10.3–14.5)
SICKLE CELL DISEASE SCREENING TEST: NEGATIVE — SIGNIFICANT CHANGE UP
SODIUM SERPL-SCNC: 142 MMOL/L — SIGNIFICANT CHANGE UP (ref 135–145)
TROPONIN T SERPL-MCNC: < 0.06 NG/ML — SIGNIFICANT CHANGE UP (ref 0–0.06)
TROPONIN T SERPL-MCNC: < 0.06 NG/ML — SIGNIFICANT CHANGE UP (ref 0–0.06)
TSH SERPL-MCNC: 0.5 UIU/ML — SIGNIFICANT CHANGE UP (ref 0.27–4.2)
WBC # BLD: 8.47 K/UL — SIGNIFICANT CHANGE UP (ref 3.8–10.5)
WBC # FLD AUTO: 8.47 K/UL — SIGNIFICANT CHANGE UP (ref 3.8–10.5)

## 2017-11-03 PROCEDURE — 99285 EMERGENCY DEPT VISIT HI MDM: CPT

## 2017-11-03 RX ORDER — AMIODARONE HYDROCHLORIDE 400 MG/1
200 TABLET ORAL ONCE
Refills: 0 | Status: COMPLETED | OUTPATIENT
Start: 2017-11-03 | End: 2017-11-03

## 2017-11-03 RX ORDER — HYDROMORPHONE HYDROCHLORIDE 2 MG/ML
2 INJECTION INTRAMUSCULAR; INTRAVENOUS; SUBCUTANEOUS ONCE
Refills: 0 | Status: DISCONTINUED | OUTPATIENT
Start: 2017-11-03 | End: 2017-11-03

## 2017-11-03 RX ORDER — DIPHENHYDRAMINE HCL 50 MG
25 CAPSULE ORAL ONCE
Refills: 0 | Status: COMPLETED | OUTPATIENT
Start: 2017-11-03 | End: 2017-11-03

## 2017-11-03 RX ORDER — HYDROMORPHONE HYDROCHLORIDE 2 MG/ML
4 INJECTION INTRAMUSCULAR; INTRAVENOUS; SUBCUTANEOUS ONCE
Refills: 0 | Status: DISCONTINUED | OUTPATIENT
Start: 2017-11-03 | End: 2017-11-03

## 2017-11-03 RX ORDER — MAGNESIUM SULFATE 500 MG/ML
2 VIAL (ML) INJECTION ONCE
Refills: 0 | Status: COMPLETED | OUTPATIENT
Start: 2017-11-03 | End: 2017-11-03

## 2017-11-03 RX ORDER — SODIUM CHLORIDE 9 MG/ML
500 INJECTION INTRAMUSCULAR; INTRAVENOUS; SUBCUTANEOUS ONCE
Refills: 0 | Status: COMPLETED | OUTPATIENT
Start: 2017-11-03 | End: 2017-11-03

## 2017-11-03 RX ORDER — SODIUM CHLORIDE 9 MG/ML
1000 INJECTION INTRAMUSCULAR; INTRAVENOUS; SUBCUTANEOUS ONCE
Refills: 0 | Status: DISCONTINUED | OUTPATIENT
Start: 2017-11-03 | End: 2017-11-03

## 2017-11-03 RX ORDER — CARVEDILOL PHOSPHATE 80 MG/1
12.5 CAPSULE, EXTENDED RELEASE ORAL ONCE
Refills: 0 | Status: COMPLETED | OUTPATIENT
Start: 2017-11-03 | End: 2017-11-03

## 2017-11-03 RX ADMIN — HYDROMORPHONE HYDROCHLORIDE 2 MILLIGRAM(S): 2 INJECTION INTRAMUSCULAR; INTRAVENOUS; SUBCUTANEOUS at 21:03

## 2017-11-03 RX ADMIN — HYDROMORPHONE HYDROCHLORIDE 4 MILLIGRAM(S): 2 INJECTION INTRAMUSCULAR; INTRAVENOUS; SUBCUTANEOUS at 22:06

## 2017-11-03 RX ADMIN — HYDROMORPHONE HYDROCHLORIDE 2 MILLIGRAM(S): 2 INJECTION INTRAMUSCULAR; INTRAVENOUS; SUBCUTANEOUS at 21:02

## 2017-11-03 RX ADMIN — HYDROMORPHONE HYDROCHLORIDE 2 MILLIGRAM(S): 2 INJECTION INTRAMUSCULAR; INTRAVENOUS; SUBCUTANEOUS at 21:38

## 2017-11-03 RX ADMIN — Medication 50 GRAM(S): at 22:06

## 2017-11-03 RX ADMIN — HYDROMORPHONE HYDROCHLORIDE 2 MILLIGRAM(S): 2 INJECTION INTRAMUSCULAR; INTRAVENOUS; SUBCUTANEOUS at 20:44

## 2017-11-03 RX ADMIN — Medication 25 MILLIGRAM(S): at 22:06

## 2017-11-03 RX ADMIN — HYDROMORPHONE HYDROCHLORIDE 2 MILLIGRAM(S): 2 INJECTION INTRAMUSCULAR; INTRAVENOUS; SUBCUTANEOUS at 21:05

## 2017-11-03 RX ADMIN — HYDROMORPHONE HYDROCHLORIDE 4 MILLIGRAM(S): 2 INJECTION INTRAMUSCULAR; INTRAVENOUS; SUBCUTANEOUS at 22:59

## 2017-11-03 RX ADMIN — AMIODARONE HYDROCHLORIDE 200 MILLIGRAM(S): 400 TABLET ORAL at 20:53

## 2017-11-03 RX ADMIN — SODIUM CHLORIDE 500 MILLILITER(S): 9 INJECTION INTRAMUSCULAR; INTRAVENOUS; SUBCUTANEOUS at 21:21

## 2017-11-03 RX ADMIN — CARVEDILOL PHOSPHATE 12.5 MILLIGRAM(S): 80 CAPSULE, EXTENDED RELEASE ORAL at 22:59

## 2017-11-03 RX ADMIN — HYDROMORPHONE HYDROCHLORIDE 2 MILLIGRAM(S): 2 INJECTION INTRAMUSCULAR; INTRAVENOUS; SUBCUTANEOUS at 22:59

## 2017-11-03 NOTE — ED PROVIDER NOTE - MEDICAL DECISION MAKING DETAILS
42 year-old female with history of WPW s/p ablation, CHF (EF of 40% per pt), sickle cell disease p/w sickle cell crisis.  Mentions this feels like her prior episodes of sickle cell crisis.  Patient to be evaluated for acute chest syndrome 42 year-old female with history of WPW s/p ablation, CHF (EF of 40% per pt), sickle cell disease p/w sickle cell crisis.  Mentions this feels like her prior episodes of sickle cell crisis.  Patient to be evaluated for acute chest syndrome.  Was admitted the CCU in the past, but signed out AMA after a few days w/o complete evaluation.  Has been having repeat episodes of SVT vs VTach in ED.  Plan to do lab work, chest x ray, EKG, and CCU consult.

## 2017-11-03 NOTE — ED PROVIDER NOTE - PROGRESS NOTE DETAILS
[facility]: Spoke with hospitalist and patient is admitted.  Rejected by CCU. [facility]: Patient's sickle cell screen came back negative.

## 2017-11-03 NOTE — ED PROVIDER NOTE - PMH
Acute decompensated heart failure    Hb-SS disease with acute chest syndrome    Other pulmonary embolism without acute cor pulmonale, unspecified chronicity    Hayley-Parkinson-White syndrome

## 2017-11-03 NOTE — ED PROVIDER NOTE - ATTENDING CONTRIBUTION TO CARE
I performed a face to face history and physical exam of the patient and discussed their management with the resident. I reviewed the resident's note and agree with the documented findings and plan of care. 41 y/o female seen with residents, with SCD, hx of WPW s/p ablation, recently admitted 2 days ago for VTach to the CCU, AMA'd, now back with L sided chest pain, HR in the 220s, presentation in room in NSR in the 90s, no fever, no cough, no abdominal pain, r/o acute chest syndrome, tele for Vtach vs. SVT, 1)CBC, CMP, Pt/PTT, VBG, retic count, CE, Troponin, BNP, 2)EKG 3)CXR, 4)antiarrythmic, 5)pain management 6)CCU 7)heme 8)admit

## 2017-11-03 NOTE — ED PROVIDER NOTE - OBJECTIVE STATEMENT
42F with history of WPW s/p ablation, CHF (EF of 40% per pt), sickle cell disease p/w body-wide pain with concerns of sickle cell crisis.  Patient mentions body wide pain ongoing since this AM.  Says current episode feels like her prior episodes of sickle cell crisis where she gets body wide joint and rib pain.  She has a history of SVT - typically happens when she gets a sickle cell crisis. 42F with history of WPW s/p ablation, CHF (EF of 40% per pt), sickle cell disease p/w body-wide pain with concerns of sickle cell crisis.  Patient mentions body wide pain ongoing since this AM.  Says current episode feels like her prior episodes of sickle cell crisis where she gets body wide joint and rib pain.  She has a history of SVT - typically happens when she gets a sickle cell crisis.  Can break them herself by bearing down.  No fevers, chills, nausea, vomiting, CP, SOB, abdominal pain, recent infections, dysuria/hematuria.

## 2017-11-03 NOTE — ED PROVIDER NOTE - PHYSICAL EXAMINATION
*Gen: lying in bed, AAO*3  *HEENT: NC/AT, MMM, airway patent, trachea midline  *CV: RRR, S1/S2 present, no murmurs/rubs/gallops  *Resp: no respiratory distress, LCTAB, no wheezing/rales/rhonchi  *Abd: non-distended, soft N/Tx4, no guarding or rigidity  *Neuro: no focal neuro deficits, moving all limbs appropriately  *Extremities: no gross deformity, PMS*4  *Skin: no rashes, no wounds   ~ Raiza Bullock M.D.

## 2017-11-03 NOTE — CONSULT NOTE ADULT - SUBJECTIVE AND OBJECTIVE BOX
HISTORY OF PRESENT ILLNESS:  Patient is a 42y old  Female who presents with a chief complaint of b  HPI: 42F PMH ? 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  Pain constant since this AM.  Says current episode feels like her prior episodes of sickle cell crisis.  Reports has a history of SVT, typically happens when she gets a sickle cell crisis. Reports also she can break stop SVT with vagal maneuver.  No fevers, chills, nausea, vomiting, CP, SOB, abdominal pain, recent infections, dysuria/hematuria    Allergies    No Known Allergies    Intolerances    	    MEDICATIONS:                  PAST MEDICAL & SURGICAL HISTORY:  Acute decompensated heart failure  Hb-SS disease with acute chest syndrome  Hayley-Parkinson-White syndrome  Other pulmonary embolism without acute cor pulmonale, unspecified chronicity  S/P IVC filter      FAMILY HISTORY:  No pertinent family history in first degree relatives      SOCIAL HISTORY:      Smoker: [ ] Active [ ] never  [ ] previous  Alcohol:  [ ] social [ ] daily [ ] never  Lives with:   Occupation:    REVIEW OF SYSTEMS:  CONSTITUTIONAL: No weakness, fevers or chills  EYES/ENT: No visual changes;  No vertigo or throat pain   NECK: No pain or stiffness  RESPIRATORY: No cough, wheezing, hemoptysis; No shortness of breath  CARDIOVASCULAR: No chest pain or palpitations  GASTROINTESTINAL: No abdominal or epigastric pain. No nausea, vomiting, or hematemesis; No diarrhea or constipation. No melena or hematochezia.  GENITOURINARY: No dysuria, frequency or hematuria  NEUROLOGICAL: No numbness or weakness  SKIN: No itching, burning, rashes, or lesions   All other review of systems is negative unless indicated above.    PHYSICAL EXAM:  ICU Vital Signs Last 24 Hrs  T(C): 36.6 (03 Nov 2017 20:12), Max: 36.6 (03 Nov 2017 20:12)  T(F): 97.8 (03 Nov 2017 20:12), Max: 97.8 (03 Nov 2017 20:12)  HR: 86 (03 Nov 2017 22:59) (86 - 220)  BP: 168/140 (03 Nov 2017 22:59) (151/116 - 211/99)  BP(mean): --  ABP: --  ABP(mean): --  RR: 16 (03 Nov 2017 22:59) (16 - 23)  SpO2: 100% (03 Nov 2017 22:59) (99% - 100%)      Appearance: Normal	  HEENT:   Normal oral mucosa, PERRL, EOMI	  Lymphatic: No lymphadenopathy  Cardiovascular: Normal S1 S2, No JVD, No murmurs, No edema  Respiratory: Lungs clear to auscultation	  Psychiatry: A & O x 3, Mood & affect appropriate  Gastrointestinal:  Soft, Non-tender, + BS	  Skin: No rashes, No ecchymoses, No cyanosis	  Neurologic: Non-focal, A&Ox3, nonfocal, DOVER x 4  Extremities: Normal range of motion, No clubbing, cyanosis or edema  Vascular: Peripheral pulses palpable 2+ bilaterally    Daily     Daily   Drug Dosing Weight  Height (cm): 167.64 (01 Nov 2017 01:53)  Weight (kg): 54.2 (01 Nov 2017 01:53)  BMI (kg/m2): 19.3 (01 Nov 2017 01:53)  BSA (m2): 1.61 (01 Nov 2017 01:53)        I&O's Summary      LABS:	 	  LABS:	 	                          9.1    8.47  )-----------( 284      ( 03 Nov 2017 20:41 )             29.9     11-03    142  |  105  |  35<H>  ----------------------------<  78  3.9   |  23  |  1.89<H>    Ca    8.3<L>      03 Nov 2017 20:41  Phos  3.3     11-03  Mg     1.8     11-03    TPro  7.0  /  Alb  3.1<L>  /  TBili  0.2  /  DBili  x   /  AST  26  /  ALT  16  /  AlkPhos  141<H>  11-03    LIVER FUNCTIONS - ( 03 Nov 2017 20:41 )  Alb: 3.1 g/dL / Pro: 7.0 g/dL / ALK PHOS: 141 u/L / ALT: 16 u/L / AST: 26 u/L / GGT: x             proBNP:   Lipid Profile:   HgA1c:   TSH: Thyroid Stimulating Hormone, Serum: 0.50 uIU/mL (11-03 @ 20:57)    PT/INR - ( 03 Nov 2017 20:41 )   PT: 11.3 SEC;   INR: 1.01          PTT - ( 03 Nov 2017 20:41 )  PTT:30.4 SEC  CARDIAC MARKERS:  Troponin T, Serum: < 0.06 ng/mL (11-03 @ 20:57)  Troponin T, Serum: < 0.06 ng/mL (11-03 @ 20:41)    Creatine Kinase, Serum: 265 u/L (11-03 @ 20:57)    CKMB: 3.20 ng/mL (11-03 @ 20:57)    CKMB Relative Index: 1.2 (11-03 @ 20:57)        CAPILLARY BLOOD GLUCOSE                TELEMETRY: 	    ECG:  	  RADIOLOGY:  OTHER: 	    PREVIOUS DIAGNOSTIC TESTING:    [ ] Echocardiogram:  [ ] Catheterization:  [ ] Stress Test:  	  	                . HISTORY OF PRESENT ILLNESS:  Patient is a 42y old  Female who presents with a chief complaint of b  HPI: 42F PMH ? WPW vs ? Afib  s/p ablation ([place], [place]), CHF (EF of 45% in recent ECHO on October 7 2017), sickle cell disease, s/p  PICC line and multiple airam-caths for access for pain medications, Antiphospholipid abx, DVT, PE s/p IVC filter, fungal infective endocarditis, pseudomonas bacteremia 2/2 infected port a cath per pt, p/w body-wide pain with concerns of sickle cell crisis.  Pain constant since this AM.  Says current episode feels like her prior episodes of sickle cell crisis. Reports history of SVT when she gets a sickle cell crisis. Reports also she can break stop SVT with vagal maneuver.  No fevers, chills, nausea, vomiting, CP, SOB, abdominal pain, recent infections, dysuria/hematuria    Allergies    No Known Allergies    Intolerances    Home Medications:   * Patient Currently Takes Medications as of 01-Nov-2017 03:15 documented in Structured Notes  · 	Hydroxyurea 1500 mg daily  	Folic Acid unkown  	carvedilol 12.5 mg oral tablet: 1 tab(s) orally 2 times a day  · 	amiodarone 200 mg oral tablet: 1  orally 2 times a day  · 	Lasix 40 mg oral tablet: 1 tab(s) orally once a day  · 	Xarelto 20 mg oral tablet: 1 tab(s) orally once a day (in the evening)          · 	Dilaudid 8 mg oral tablet: 1 tab(s) orally every 4 hours      PAST MEDICAL & SURGICAL HISTORY:  Acute decompensated heart failure  Hb-SS disease with acute chest syndrome  Hayley-Parkinson-White syndrome  Other pulmonary embolism without acute cor pulmonale, unspecified chronicity  S/P IVC filter      FAMILY HISTORY:  No pertinent family history in first degree relatives      SOCIAL HISTORY:      Smoker: [ x] Active [ ] never  [ ] previous  Alcohol:  [x ] social [ ] daily [ ] never  Lives with: family  Occupation: venom expert at [employer]    REVIEW OF SYSTEMS:  CONSTITUTIONAL: (+) generalized pain. No, fevers or chills  EYES/ENT: No visual changes;  No vertigo or throat pain   NECK: No pain or stiffness  RESPIRATORY: No cough, wheezing, hemoptysis; No shortness of breath  CARDIOVASCULAR: No chest pain or palpitations  GASTROINTESTINAL: No abdominal or epigastric pain. No nausea, vomiting, or hematemesis; No diarrhea or constipation. No melena or hematochezia.  GENITOURINARY: No dysuria, frequency or hematuria  NEUROLOGICAL: No numbness or weakness  SKIN: No itching, burning, rashes, or lesions   All other review of systems is negative unless indicated above.    PHYSICAL EXAM:  ICU Vital Signs Last 24 Hrs  T(C): 36.6 (03 Nov 2017 20:12), Max: 36.6 (03 Nov 2017 20:12)  T(F): 97.8 (03 Nov 2017 20:12), Max: 97.8 (03 Nov 2017 20:12)  HR: 86 (03 Nov 2017 22:59) (86 - 220)  BP: 168/140 (03 Nov 2017 22:59) (151/116 - 211/99)  BP(mean): --  ABP: --  ABP(mean): --  RR: 16 (03 Nov 2017 22:59) (16 - 23)  SpO2: 100% (03 Nov 2017 22:59) (99% - 100%)      Appearance: Normal	  HEENT:   Normal oral mucosa, PERRL, EOMI	  Lymphatic: No lymphadenopathy  Cardiovascular: Normal S1 S2, No JVD, No murmurs, No edema  Respiratory: Lungs clear to auscultation	  Psychiatry: A & O x 3, Mood & affect appropriate  Gastrointestinal:  Soft, Non-tender, + BS	  Skin: No rashes,   Neurologic: Non-focal, A&Ox3, nonfocal, DOVER x 4  Extremities: No clubbing, cyanosis or edema  Vascular: Peripheral pulses palpable 2+ bilaterally    Daily     Daily   Drug Dosing Weight  Height (cm): 167.64 (01 Nov 2017 01:53)  Weight (kg): 54.2 (01 Nov 2017 01:53)  BMI (kg/m2): 19.3 (01 Nov 2017 01:53)  BSA (m2): 1.61 (01 Nov 2017 01:53)                           9.1    8.47  )-----------( 284      ( 03 Nov 2017 20:41 )             29.9     11-03    142  |  105  |  35<H>  ----------------------------<  78  3.9   |  23  |  1.89<H>    Ca    8.3<L>      03 Nov 2017 20:41  Phos  3.3     11-03  Mg     1.8     11-03    TPro  7.0  /  Alb  3.1<L>  /  TBili  0.2  /  DBili  x   /  AST  26  /  ALT  16  /  AlkPhos  141<H>  11-03    LIVER FUNCTIONS - ( 03 Nov 2017 20:41 )  Alb: 3.1 g/dL / Pro: 7.0 g/dL / ALK PHOS: 141 u/L / ALT: 16 u/L / AST: 26 u/L / GGT: x             proBNP:   Lipid Profile:   HgA1c:   TSH: Thyroid Stimulating Hormone, Serum: 0.50 uIU/mL (11-03 @ 20:57)    PT/INR - ( 03 Nov 2017 20:41 )   PT: 11.3 SEC;   INR: 1.01          PTT - ( 03 Nov 2017 20:41 )  PTT:30.4 SEC  CARDIAC MARKERS:  Troponin T, Serum: < 0.06 ng/mL (11-03 @ 20:57)  Troponin T, Serum: < 0.06 ng/mL (11-03 @ 20:41)    Creatine Kinase, Serum: 265 u/L (11-03 @ 20:57)    CKMB: 3.20 ng/mL (11-03 @ 20:57)    CKMB Relative Index: 1.2 (11-03 @ 20:57  	    ECG: NSR, HR 92. TWI I, II, avl, V5, V6 	  RADIOLOGY:   	    PREVIOUS DIAGNOSTIC TESTING:    [ ] Echocardiogram: none  [ ] Catheterization: none  [ ] Stress Test:  	none  	    Assessment    42F PMH ? 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  Called for episodes wide complex tachycardia. Now In NSR. Unlikely wide complex tachycardia/SVT - QRS mapped out to HR in 90s on tele strips. Pt hemodynamically stable. No need for CCU admission at this time. Admit to medicine with cardiology consult to follow.    Problem/Plan    Monitor on telemetry  Continue home medication - pt reports she didnt take PM Coreg. Please give Coreg 12.5 x 1 now.  Recent out pt echo reviewed - EF 45%, Diastolic dysfunction II  Pain control as per medicine service        . HISTORY OF PRESENT ILLNESS:  Patient is a 42y old  Female who presents with a chief complaint of b  HPI: 42F PMH ? WPW vs ? Afib  s/p ablation ([place], [place]), CHF (EF of 45% in recent ECHO on October 7 2017), sickle cell disease, s/p  PICC line and multiple airam-caths for IV access. Antiphospholipid abx, DVT, PE s/p IVC filter, fungal infective endocarditis, pseudomonas bacteremia 2/2 infected port a cath per pt, p/w body-wide pain with concerns of sickle cell crisis.  Pain constant since this AM.  Says current episode feels like her prior episodes of sickle cell crisis. Reports history of SVT when she gets a sickle cell crisis. Reports also she can break stop SVT with vagal maneuver.  No fevers, chills, nausea, vomiting, CP, SOB, abdominal pain, recent infections, dysuria/hematuria.    Allergies    No Known Allergies    Intolerances    Home Medications:   * Patient Currently Takes Medications as of 01-Nov-2017 03:15 documented in Structured Notes  · 	Hydroxyurea 1500 mg daily  	Folic Acid unkown  	carvedilol 12.5 mg oral tablet: 1 tab(s) orally 2 times a day  · 	amiodarone 200 mg oral tablet: 1  orally 2 times a day  · 	Lasix 40 mg oral tablet: 1 tab(s) orally once a day  · 	Xarelto 20 mg oral tablet: 1 tab(s) orally once a day (in the evening)          · 	Dilaudid 8 mg oral tablet: 1 tab(s) orally every 4 hours      PAST MEDICAL & SURGICAL HISTORY:  Acute decompensated heart failure  Hb-SS disease with acute chest syndrome  Hayley-Parkinson-White syndrome  Other pulmonary embolism without acute cor pulmonale, unspecified chronicity  S/P IVC filter      FAMILY HISTORY:  No pertinent family history in first degree relatives      SOCIAL HISTORY:      Smoker: [ x] Active [ ] never  [ ] previous  Alcohol:  [x ] social [ ] daily [ ] never  Lives with: family  Occupation: venom expert at [employer]    REVIEW OF SYSTEMS:  CONSTITUTIONAL: (+) generalized pain. No, fevers or chills  EYES/ENT: No visual changes;  No vertigo or throat pain   NECK: No pain or stiffness  RESPIRATORY: No cough, wheezing, hemoptysis; No shortness of breath  CARDIOVASCULAR: No chest pain or palpitations  GASTROINTESTINAL: No abdominal or epigastric pain. No nausea, vomiting, or hematemesis; No diarrhea or constipation. No melena or hematochezia.  GENITOURINARY: No dysuria, frequency or hematuria  NEUROLOGICAL: No numbness or weakness  SKIN: No itching, burning, rashes, or lesions   All other review of systems is negative unless indicated above.    PHYSICAL EXAM:  ICU Vital Signs Last 24 Hrs  T(C): 36.6 (03 Nov 2017 20:12), Max: 36.6 (03 Nov 2017 20:12)  T(F): 97.8 (03 Nov 2017 20:12), Max: 97.8 (03 Nov 2017 20:12)  HR: 86 (03 Nov 2017 22:59) (86 - 220)  BP: 168/140 (03 Nov 2017 22:59) (151/116 - 211/99)  BP(mean): --  ABP: --  ABP(mean): --  RR: 16 (03 Nov 2017 22:59) (16 - 23)  SpO2: 100% (03 Nov 2017 22:59) (99% - 100%)      Appearance: Normal	  HEENT:   Normal oral mucosa, PERRL, EOMI	  Lymphatic: No lymphadenopathy  Cardiovascular: Normal S1 S2, No JVD, No murmurs, No edema  Respiratory: Lungs clear to auscultation	  Psychiatry: A & O x 3, Mood & affect appropriate  Gastrointestinal:  Soft, Non-tender, + BS	  Skin: No rashes,   Neurologic: Non-focal, A&Ox3, nonfocal, DOVER x 4  Extremities: No clubbing, cyanosis or edema  Vascular: Peripheral pulses palpable 2+ bilaterally    Daily     Daily   Drug Dosing Weight  Height (cm): 167.64 (01 Nov 2017 01:53)  Weight (kg): 54.2 (01 Nov 2017 01:53)  BMI (kg/m2): 19.3 (01 Nov 2017 01:53)  BSA (m2): 1.61 (01 Nov 2017 01:53)                           9.1    8.47  )-----------( 284      ( 03 Nov 2017 20:41 )             29.9     11-03    142  |  105  |  35<H>  ----------------------------<  78  3.9   |  23  |  1.89<H>    Ca    8.3<L>      03 Nov 2017 20:41  Phos  3.3     11-03  Mg     1.8     11-03    TPro  7.0  /  Alb  3.1<L>  /  TBili  0.2  /  DBili  x   /  AST  26  /  ALT  16  /  AlkPhos  141<H>  11-03    LIVER FUNCTIONS - ( 03 Nov 2017 20:41 )  Alb: 3.1 g/dL / Pro: 7.0 g/dL / ALK PHOS: 141 u/L / ALT: 16 u/L / AST: 26 u/L / GGT: x             proBNP:   Lipid Profile:   HgA1c:   TSH: Thyroid Stimulating Hormone, Serum: 0.50 uIU/mL (11-03 @ 20:57)    PT/INR - ( 03 Nov 2017 20:41 )   PT: 11.3 SEC;   INR: 1.01          PTT - ( 03 Nov 2017 20:41 )  PTT:30.4 SEC  CARDIAC MARKERS:  Troponin T, Serum: < 0.06 ng/mL (11-03 @ 20:57)  Troponin T, Serum: < 0.06 ng/mL (11-03 @ 20:41)    Creatine Kinase, Serum: 265 u/L (11-03 @ 20:57)    CKMB: 3.20 ng/mL (11-03 @ 20:57)    CKMB Relative Index: 1.2 (11-03 @ 20:57  	    ECG: NSR, HR 92. TWI I, II, avl, V5, V6 	  RADIOLOGY:   	    PREVIOUS DIAGNOSTIC TESTING:    [ ] Echocardiogram: none  [ ] Catheterization: none  [ ] Stress Test:  	none  	    Assessment    42F PMH ? 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  Called for episodes wide complex tachycardia. Now In NSR. Unlikely wide complex tachycardia/SVT - QRS mapped out to HR in 90s on tele strips. Pt hemodynamically stable. No need for CCU admission at this time. Admit to medicine with cardiology consult to follow.    Problem/Plan    Monitor on telemetry  Continue home medication - pt reports she didnt take PM Coreg. Please give Coreg 12.5 x 1 now.  Recent out pt echo reviewed - EF 45%, Diastolic dysfunction II  Pain control as per medicine service        .

## 2017-11-03 NOTE — ED ADULT TRIAGE NOTE - CHIEF COMPLAINT QUOTE
Pt c/o sob ,  rib , upper back,  and leg pain .  Denies chest pain , dizziness, chills.  Pt in SVT ,  at triage

## 2017-11-04 NOTE — CHART NOTE - NSCHARTNOTEFT_GEN_A_CORE
MAR  [id number]  11/4/2017    Came to assess patient who was in sport 7A. Patient was noted to be shaking and stating that she was in pain. Checked patient's o2 sats which was 100%. Patient inquired as to when she could get pain medication. Patient was told that her labs dilaudid medication would have to be checked and she could get more medication after 4 hours since her last dose. At this point, the patient arose, began dressing herself and stating that she was going to leave. Patient was made aware of the complications that could arise if she left which includes but is not limited to infectious diseases, increased pain, chest syndrome, and/or withdrawal if she did not have sufficient pain medication at home. Patient acknowledged understanding and left after her IV was discontinued by the MAR.         Richard Augustin MD  [id number]

## 2017-11-07 LAB
HGB A MFR BLD: 94.8 % — SIGNIFICANT CHANGE UP
HGB A2 MFR BLD: 3.8 % — HIGH (ref 2.4–3.5)
HGB ELECT COMMENT: SIGNIFICANT CHANGE UP
HGB F MFR BLD: < 1 % — SIGNIFICANT CHANGE UP (ref 0–1.5)

## 2017-12-04 NOTE — ED ADULT NURSE NOTE - OBJECTIVE STATEMENT
Pt 42y female, aaox3 and amb, pt presents to ed with lower back, chest and bilat leg pain from possible sickle cell crisis. Pt has hx of CHF, SCD. Pt in and out of SVT HR elevated in 200s, pt can break HR by herself usually by bearing down. Pt appears in pain and uncomfortable, pt states she feels SOB with heavy shallow breathing, O2 100% on RA. Pt denies n/v/d, chills, fever, abd pain, dysuria, hematuria. Will continue to monitor. IV ultra sounded by MD. Labs drawn and sent, will continue to monitor.
04-Dec-2017 04:47

## 2021-03-02 NOTE — ED ADULT NURSE REASSESSMENT NOTE - NS ED NURSE REASSESS COMMENT FT1
Pt aaox3 and amb, still c/o intense pain, chest, back, and leg, BP stilled elevated, Pt in and out of SVT with HR in 200s and she breaks it manually by bearing down with HR going to NSR in 80s, CCU at bedside now for eval, will continue to monitor. Protopic Counseling: Patient may experience a mild burning sensation during topical application. Protopic is not approved in children less than 2 years of age. There have been case reports of hematologic and skin malignancies in patients using topical calcineurin inhibitors although causality is questionable.

## 2022-06-09 NOTE — ED ADULT NURSE NOTE - CHIEF COMPLAINT QUOTE
Short term disability form filled out an faxed to [facility]     Scanned     Portal message has been sent to pt  
29
Pt c/o sob ,  rib , upper back,  and leg pain .  Denies chest pain , dizziness, chills.  Pt in SVT ,  at triage

## 2024-05-15 RX ORDER — CARVEDILOL PHOSPHATE 80 MG/1
1 CAPSULE, EXTENDED RELEASE ORAL
Qty: 0 | Refills: 0 | DISCHARGE

## 2024-05-15 RX ORDER — AMIODARONE HYDROCHLORIDE 400 MG/1
1 TABLET ORAL
Qty: 0 | Refills: 0 | DISCHARGE

## 2024-05-15 RX ORDER — HYDROMORPHONE HYDROCHLORIDE 2 MG/ML
1 INJECTION INTRAMUSCULAR; INTRAVENOUS; SUBCUTANEOUS
Qty: 0 | Refills: 0 | DISCHARGE

## 2024-05-15 RX ORDER — FUROSEMIDE 40 MG
1 TABLET ORAL
Qty: 0 | Refills: 0 | DISCHARGE

## 2024-05-15 RX ORDER — RIVAROXABAN 15 MG-20MG
1 KIT ORAL
Qty: 0 | Refills: 0 | DISCHARGE
